# Patient Record
Sex: MALE | Race: WHITE | NOT HISPANIC OR LATINO | Employment: FULL TIME | URBAN - METROPOLITAN AREA
[De-identification: names, ages, dates, MRNs, and addresses within clinical notes are randomized per-mention and may not be internally consistent; named-entity substitution may affect disease eponyms.]

---

## 2017-01-04 RX ORDER — AMLODIPINE BESYLATE 10 MG/1
TABLET ORAL
Qty: 90 TABLET | Refills: 0 | Status: SHIPPED | OUTPATIENT
Start: 2017-01-04 | End: 2023-10-14 | Stop reason: CLARIF

## 2017-03-03 RX ORDER — METFORMIN HYDROCHLORIDE 500 MG/1
TABLET ORAL
Qty: 90 TABLET | Refills: 0 | Status: SHIPPED | OUTPATIENT
Start: 2017-03-03 | End: 2023-10-14 | Stop reason: CLARIF

## 2017-03-12 DIAGNOSIS — I11.9 BENIGN HYPERTENSIVE HEART DISEASE WITHOUT HEART FAILURE: Primary | ICD-10-CM

## 2017-03-12 DIAGNOSIS — E07.9 THYROID DISEASE: ICD-10-CM

## 2017-03-12 DIAGNOSIS — R80.9 TYPE 2 DIABETES MELLITUS WITH MICROALBUMINURIA, WITHOUT LONG-TERM CURRENT USE OF INSULIN: ICD-10-CM

## 2017-03-12 DIAGNOSIS — N18.2 CKD (CHRONIC KIDNEY DISEASE), STAGE II: ICD-10-CM

## 2017-03-12 DIAGNOSIS — Z12.5 PROSTATE CANCER SCREENING: ICD-10-CM

## 2017-03-12 DIAGNOSIS — E11.29 TYPE 2 DIABETES MELLITUS WITH MICROALBUMINURIA, WITHOUT LONG-TERM CURRENT USE OF INSULIN: ICD-10-CM

## 2017-03-12 NOTE — LETTER
March 16, 2017    Volodymyr JACKSON Shun  [address] [facility] [place] [address]             [facility]  [address]  [address]  [place] [address]  Phone: [phone]  Fax: [phone] Dear Mr. Hoffmann:    This is a reminder that it is time for an appointment with Dr Peraza and blood work. Please call [phone] to schedule your appointments.       If you have any questions or concerns, please don't hesitate to call.    Sincerely,        Bianca Martinez MD

## 2017-03-13 RX ORDER — LISINOPRIL 20 MG/1
TABLET ORAL
Qty: 90 TABLET | Refills: 0 | Status: SHIPPED | OUTPATIENT
Start: 2017-03-13 | End: 2023-10-14 | Stop reason: CLARIF

## 2017-03-13 RX ORDER — LEVOTHYROXINE SODIUM 125 UG/1
TABLET ORAL
Qty: 90 TABLET | Refills: 0 | Status: SHIPPED | OUTPATIENT
Start: 2017-03-13 | End: 2023-10-14 | Stop reason: CLARIF

## 2017-03-13 NOTE — TELEPHONE ENCOUNTER
Pt needs appt and labs; labs ordered; Pt may get them prior to appt so we can go over them during visit and adjust medication based on results.

## 2017-03-21 ENCOUNTER — TELEPHONE (OUTPATIENT)
Dept: FAMILY MEDICINE | Facility: CLINIC | Age: 62
End: 2017-03-21

## 2017-03-21 NOTE — TELEPHONE ENCOUNTER
Received fax from [facility] requesting refill of synthroid, faxed back with note he is no longer our patient

## 2017-03-21 NOTE — TELEPHONE ENCOUNTER
Called patient to let him know we faxed refill request back to [facility] stating he is no longer our patient

## 2017-03-21 NOTE — TELEPHONE ENCOUNTER
----- Message from Maria Eugenia Thomas sent at 3/21/2017  8:46 AM CDT -----  Contact: Self  Patient states he is no longer a patient and would like to make sure the doctor does not refill anymore medications for him. ([facility]) is calling on his behalf.

## 2023-10-14 ENCOUNTER — HOSPITAL ENCOUNTER (EMERGENCY)
Facility: HOSPITAL | Age: 68
Discharge: HOME | End: 2023-10-14
Payer: MEDICARE

## 2023-10-14 VITALS
OXYGEN SATURATION: 97 % | TEMPERATURE: 99 F | WEIGHT: 210 LBS | DIASTOLIC BLOOD PRESSURE: 93 MMHG | RESPIRATION RATE: 16 BRPM | BODY MASS INDEX: 32.96 KG/M2 | SYSTOLIC BLOOD PRESSURE: 163 MMHG | HEIGHT: 67 IN | HEART RATE: 67 BPM

## 2023-10-14 DIAGNOSIS — H57.89 EYE SWELLING, RIGHT: ICD-10-CM

## 2023-10-14 DIAGNOSIS — S05.90XA EYE INJURY, INITIAL ENCOUNTER: Primary | ICD-10-CM

## 2023-10-14 PROCEDURE — 99284 EMERGENCY DEPT VISIT MOD MDM: CPT

## 2023-10-14 PROCEDURE — 25000003 PHARM REV CODE 250

## 2023-10-14 PROCEDURE — 96372 THER/PROPH/DIAG INJ SC/IM: CPT

## 2023-10-14 PROCEDURE — 99284 EMERGENCY DEPT VISIT MOD MDM: CPT | Mod: GF

## 2023-10-14 PROCEDURE — 63600175 PHARM REV CODE 636 W HCPCS

## 2023-10-14 RX ORDER — ERYTHROMYCIN 5 MG/G
OINTMENT OPHTHALMIC
Status: COMPLETED | OUTPATIENT
Start: 2023-10-14 | End: 2023-10-14

## 2023-10-14 RX ORDER — AMOXICILLIN AND CLAVULANATE POTASSIUM 875; 125 MG/1; MG/1
1 TABLET, FILM COATED ORAL 2 TIMES DAILY
Qty: 14 TABLET | Refills: 0 | Status: SHIPPED | OUTPATIENT
Start: 2023-10-14

## 2023-10-14 RX ORDER — CEFTRIAXONE 1 G/1
1 INJECTION, POWDER, FOR SOLUTION INTRAMUSCULAR; INTRAVENOUS
Status: COMPLETED | OUTPATIENT
Start: 2023-10-14 | End: 2023-10-14

## 2023-10-14 RX ORDER — LOSARTAN POTASSIUM 100 MG/1
100 TABLET ORAL
COMMUNITY
Start: 2023-09-30

## 2023-10-14 RX ORDER — TETRACAINE HYDROCHLORIDE 5 MG/ML
2 SOLUTION OPHTHALMIC
Status: COMPLETED | OUTPATIENT
Start: 2023-10-14 | End: 2023-10-14

## 2023-10-14 RX ORDER — LEVOTHYROXINE SODIUM 150 UG/1
150 TABLET ORAL
COMMUNITY
Start: 2023-09-30

## 2023-10-14 RX ADMIN — TETRACAINE HYDROCHLORIDE 2 DROP: 5 SOLUTION OPHTHALMIC at 02:10

## 2023-10-14 RX ADMIN — CEFTRIAXONE SODIUM 1 G: 1 INJECTION, POWDER, FOR SOLUTION INTRAMUSCULAR; INTRAVENOUS at 02:10

## 2023-10-14 RX ADMIN — ERYTHROMYCIN 1 INCH: 5 OINTMENT OPHTHALMIC at 03:10

## 2023-10-14 RX ADMIN — FLUORESCEIN SODIUM 1 EACH: 1 STRIP OPHTHALMIC at 02:10

## 2023-10-14 NOTE — DISCHARGE INSTRUCTIONS
There is no obvious corneal abrasion after your fluorescein stain exam.  However, we will still cover with antibiotics until you are cleared by the ophthalmologist.  Take medication as directed.  Apply ointment 3 times per day.  You may use cool/warm compresses to the right eye for comfort.  Follow up with your ophthalmologist on Monday morning for further evaluation and treatment.  Return to the emergency department for any new or worrisome symptoms.

## 2023-10-14 NOTE — ED PROVIDER NOTES
Encounter Date: 10/14/2023       History     Chief Complaint   Patient presents with    Eye Problem     Patient is a 68-year-old white male who presents to the emergency department with complaints of redness and tearing in his right eye.  He reports that [place] and while he was riding out he felt like something flew into his eye.  He states that he had an immediate burning sensation.  He got home and irrigated with copious amounts of water with minimal relief.  He states he went to the drug store today to attempt to find some medicine but noticed they were closed and came here to seek medical evaluation.  He has been dabbing his right eye with a paper towel intermittently since the episode began.  He denies any visual disturbance.  Visual acuity is 20/20 in the left eye and 20/30 in the right eye.  He denies any other injury during the episode.  He has no other complaints.  Blood pressure is 166/105 at the time my evaluation.  He appears in no acute distress.  Vital signs are within normal limits otherwise.    The history is provided by the patient.     Review of patient's allergies indicates:  No Known Allergies  Past Medical History:   Diagnosis Date    ADD (attention deficit disorder)     Depression     Diabetes mellitus type II     Fatty liver     Hypertension     Insomnia     JOSE (obstructive sleep apnea) 2005    Thyroid disease      Past Surgical History:   Procedure Laterality Date    COLONOSCOPY N/A 11/18/2015    Procedure: COLONOSCOPY;  Surgeon: Taran Lara MD;  Location: [facility];  Service: Endoscopy;  Laterality: N/A;    WRIST FRACTURE SURGERY       History reviewed. No pertinent family history.  Social History     Tobacco Use    Smoking status: Never   Substance Use Topics    Alcohol use: Yes     Alcohol/week: 4.2 standard drinks of alcohol     Types: 5 drink(s) per week    Drug use: No     Review of Systems   Constitutional:  Negative for activity change, appetite  change, chills and fever.   HENT:  Negative for congestion, sore throat and trouble swallowing.    Eyes:  Positive for discharge (tearing) and redness (right). Negative for visual disturbance.   Respiratory:  Negative for shortness of breath.    Cardiovascular:  Negative for chest pain.   Gastrointestinal:  Negative for nausea and vomiting.   Genitourinary:  Negative for dysuria and frequency.   Musculoskeletal:  Negative for arthralgias, back pain, neck pain and neck stiffness.   Skin:  Negative for color change, pallor and rash.   Neurological:  Negative for dizziness, syncope, facial asymmetry, speech difficulty, weakness and headaches.   Psychiatric/Behavioral:  Negative for confusion. The patient is not nervous/anxious.    All other systems reviewed and are negative.      Physical Exam     Initial Vitals [10/14/23 1411]   BP Pulse Resp Temp SpO2   (!) 186/108 82 18 99 °F (37.2 °C) 96 %      MAP       --         Physical Exam    Nursing note and vitals reviewed.  Constitutional: He appears well-developed and well-nourished. No distress.   HENT:   Head: Normocephalic and atraumatic.   Eyes: EOM are normal. Pupils are equal, round, and reactive to light. Lids are everted and swept, no foreign bodies found. Right conjunctiva is injected. Left conjunctiva is not injected.   Mild redness and inflammation noted below the right lower eye.  No obvious signs of trauma.  Fluorescein stain was performed to right eye with no signs of obvious abrasion.   Neck: Neck supple.   Normal range of motion.  Cardiovascular:  Normal rate, regular rhythm and normal heart sounds.           Pulmonary/Chest: Breath sounds normal. No respiratory distress.   Abdominal: Abdomen is soft. Bowel sounds are normal. He exhibits no distension. There is no abdominal tenderness.   Musculoskeletal:         General: Normal range of motion.      Cervical back: Normal range of motion and neck supple.     Neurological: He is alert and oriented to person,  place, and time. He has normal strength. GCS score is 15. GCS eye subscore is 4. GCS verbal subscore is 5. GCS motor subscore is 6.   Skin: Skin is warm and dry. Capillary refill takes less than 2 seconds.   Psychiatric: He has a normal mood and affect. His behavior is normal. Judgment and thought content normal.         Medical Screening Exam   See Full Note    ED Course   Procedures  Labs Reviewed - No data to display       Imaging Results    None          Medications   TETRAcaine HCl (PF) 0.5 % Drop 2 drop (2 drops Right Eye Given 10/14/23 1423)   fluorescein ophthalmic strip 1 each (1 each Right Eye Given 10/14/23 1423)   cefTRIAXone injection 1 g (1 g Intramuscular Given 10/14/23 1447)   erythromycin 5 mg/gram (0.5 %) ophthalmic ointment (1 inch Right Eye Given 10/14/23 1511)     Medical Decision Making  Patient presents with a possible insect bite/sting to the right eye yesterday.  He is already irrigated after the initial event with a copious amounts of water.  He reports that he has a scratchy sensation in his eye.  Tetracaine drops were placed to the right eye.  Fluorescein stain procedure was performed and no obvious corneal abrasion was identified.  Stain was irrigated with normal saline.  We will place the patient on antibiotic therapy by mouth until he can be further evaluated by the ophthalmologist to attempt to prevent infection in the tissue around the right eye.  We will also have the patient use erythromycin ointment in the interim.  He has been instructed to follow up with his ophthalmologist Monday morning at [place] local [place].  Strict ED return precautions were explained to the patient.  He verbalizes understanding and is in agreement with this plan.    Amount and/or Complexity of Data Reviewed  Independent Historian:      Details: Patient is a 68-year-old white male who presents to the emergency department with complaints of redness and tearing in his right eye.  He reports that he was working  at the [place] [employer] yesterday and while he was riding out he felt like something flew into his eye.  He states that he had an immediate burning sensation.  He got home and irrigated with copious amounts of water with minimal relief.  He states he went to the drug store today to attempt to find some medicine but noticed they were closed and came here to seek medical evaluation.  He has been dabbing his right eye with a paper towel intermittently since the episode began.  He denies any visual disturbance.  Visual acuity is 20/20 in the left eye and 20/30 in the right eye.  He denies any other injury during the episode.  He has no other complaints.  Blood pressure is 166/105 at the time my evaluation.  He appears in no acute distress.  Vital signs are within normal limits otherwise.    Risk  Prescription drug management.  Risk Details: Patient presents for emergent evaluation of acute right eye injury that poses a threat to life and/or bodily function.    In the ED patient found to have acute eye injury, initial encounter (right eye/possible insect sting)/eye swelling, right.    No labs or radiological imaging required during this visit.    Patient was managed in the ED with tetracaine, fluorescein, erythromycin ointment, Rocephin 1 g IM  The response to treatment was improved.    Patient was discharged in stable condition.  Detailed return precautions discussed.     Dx: eye injury, initial encounter (right eye/possible insect sting)/eye swelling, right.                                 Clinical Impression:   Final diagnoses:  [S05.90XA] Eye injury, initial encounter - right eye- possible insect sting (Primary)  [H57.89] Eye swelling, right        ED Disposition Condition    Discharge Stable          ED Prescriptions       Medication Sig Dispense Start Date End Date Auth. Provider    amoxicillin-clavulanate 875-125mg (AUGMENTIN) 875-125 mg per tablet Take 1 tablet by mouth 2 (two) times daily. 14 tablet 10/14/2023 --  Thom Perez, ANDRA          Follow-up Information    None          Thom Perez, ANDRA  10/14/23 1518